# Patient Record
Sex: FEMALE | Race: WHITE
[De-identification: names, ages, dates, MRNs, and addresses within clinical notes are randomized per-mention and may not be internally consistent; named-entity substitution may affect disease eponyms.]

---

## 2018-11-28 ENCOUNTER — HOSPITAL ENCOUNTER (EMERGENCY)
Dept: HOSPITAL 97 - ER | Age: 38
LOS: 1 days | Discharge: HOME | End: 2018-11-29
Payer: COMMERCIAL

## 2018-11-28 DIAGNOSIS — E78.00: ICD-10-CM

## 2018-11-28 DIAGNOSIS — E04.1: Primary | ICD-10-CM

## 2018-11-28 PROCEDURE — 99282 EMERGENCY DEPT VISIT SF MDM: CPT

## 2018-11-29 VITALS — SYSTOLIC BLOOD PRESSURE: 127 MMHG | TEMPERATURE: 98.2 F | OXYGEN SATURATION: 99 % | DIASTOLIC BLOOD PRESSURE: 87 MMHG

## 2018-11-29 NOTE — ER
Nurse's Notes                                                                                     

 [facility]                                                                

Name: Dom Lim                                                                              

Age: 38 yrs                                                                                       

Sex: Female                                                                                       

: 1980                                                                                   

MRN: [id number]                                                                                   

Arrival Date: 2018                                                                          

Time: 21:52                                                                                       

Account#: [id number]                                                                            

Bed 11                                                                                            

Private MD:                                                                                       

Diagnosis: Thyroid nodule                                                                         

                                                                                                  

Presentation:                                                                                     

                                                                                             

22:45 Presenting complaint: Patient states: Throat is getting swallow. Patient reports        ao  

      getting a viral infections last week and then started notice her neck is getting            

      swallow. Patient report pain in her neck and ear. Transition of care: patient was not       

      received from another setting of care. Onset of symptoms is unknown. Risk Assessment:       

      Do you want to hurt yourself or someone else? Patient reports no desire to harm self or     

      others. Initial Sepsis Screen: Does the patient meet any 2 criteria? No. Patient's          

      initial sepsis screen is negative. Does the patient have a suspected source of              

      infection? No. Patient's initial sepsis screen is negative. Care prior to arrival: None.    

22:45 Method Of Arrival: Ambulatory                                                           ao  

22:45 Acuity: SABA 4                                                                           ao  

                                                                                                  

Triage Assessment:                                                                                

23:21 General: Appears comfortable, Behavior is calm, cooperative, appropriate for age. Pain: fc  

      Complains of pain in neck Quality of pain is described as aching, dull, Pain began          

      years ago. Is intermittent. EENT: No deficits noted. Neuro: Level of Consciousness is       

      awake, alert, obeys commands, Oriented to person, place, time, situation.                   

      Cardiovascular: No deficits noted. Respiratory: No deficits noted. GI: No deficits          

      noted. : No deficits noted. Derm: Skin is pink, warm \T\ dry. knot noted to frontal       

      neck. Musculoskeletal: Circulation, motion, and sensation intact. Capillary refill < 3      

      seconds, Range of motion: intact in all extremities.                                        

                                                                                                  

OB/GYN:                                                                                           

22:49 LMP N/A - Hysterectomy                                                                  ao  

                                                                                                  

Historical:                                                                                       

- Allergies:                                                                                      

22:49 No Known Allergies;                                                                     ao  

- Home Meds:                                                                                      

22:49 None [Active];                                                                          ao  

- PMHx:                                                                                           

22:49 High Cholesterol; Migraines; Endometrosis;                                              ao  

- PSHx:                                                                                           

22:49 Hysterectomy; Appendectomy; Cholecystectomy;                                            ao  

                                                                                                  

- Immunization history:: Adult Immunizations up to date.                                          

- Social history:: Smoking status: Patient/guardian denies using tobacco,                         

  Patient/guardian denies using alcohol, street drugs.                                            

- Ebola Screening: : Patient negative for fever greater than or equal to 101.5 degrees            

  Fahrenheit, and additional compatible Ebola Virus Disease symptoms Patient denies               

  exposure to infectious person Patient denies travel to an Ebola-affected area in the            

  21 days before illness onset.                                                                   

                                                                                                  

                                                                                                  

Screenin:20 Abuse screen: Denies threats or abuse. Nutritional screening: No deficits noted.        fc  

      Tuberculosis screening: No symptoms or risk factors identified. Fall Risk None              

      identified.                                                                                 

                                                                                                  

Assessment:                                                                                       

23:23 Reassessment: No changes from previously documented assessment. Patient and/or family   fc  

      updated on plan of care and expected duration. Pain level reassessed. Patient is alert,     

      oriented x 3, equal unlabored respirations, skin warm/dry/pink.                             

                                                                                             

00:35 Reassessment: Zaheer CONDE at bedside to see and examine pt.                              fc  

                                                                                                  

Vital Signs:                                                                                      

                                                                                             

22:49  / 87; Pulse 82; Resp 18; Temp 98.2(O); Pulse Ox 99% on R/A; Weight 79.38 kg;     ao  

      Height 5 ft. 4 in. (162.56 cm); Pain 7/10;                                                  

                                                                                             

01:33 Pulse 80; Resp 16; Pulse Ox 99% on R/A;                                                 ao  

                                                                                             

22:49 Body Mass Index 30.04 (79.38 kg, 162.56 cm)                                             ao  

                                                                                                  

ED Course:                                                                                        

                                                                                             

21:52 Patient arrived in ED.                                                                  ds1 

22:47 Triage completed.                                                                       ao  

22:50 Arm band placed on right wrist. Patient placed in waiting room, Patient notified of     ao  

      wait time.                                                                                  

23:20 Patient has correct armband on for positive identification. Bed in low position. Call   fc  

      light in reach.                                                                             

23:20 No provider procedures requiring assistance completed.                                  fc  

23:29 Zaheer Sunshine NP is PHCP.                                                           pm1 

23:29 Candelario Rios MD is Attending Physician.                                             pm1 

                                                                                             

01:33 Patient did not have IV access during this emergency room visit.                        ao  

                                                                                                  

Administered Medications:                                                                         

No medications were administered                                                                  

                                                                                                  

                                                                                                  

Outcome:                                                                                          

01:10 Discharge ordered by MD.                                                                pm1 

01:33 Discharged to home ambulatory.                                                          ao  

01:33 Condition: stable                                                                           

01:33 Discharge instructions given to patient, Instructed on discharge instructions, follow       

      up and referral plans. Demonstrated understanding of instructions, follow-up care,          

      medications, Prescriptions given X 1.                                                       

01:34 Patient left the ED.                                                                    ao  

                                                                                                  

Signatures:                                                                                       

Chretien, Yasmin, RN                   RN   Lissette Maier                                ds1                                                  

Tanner Rubio, RN                         RN   ao                                                   

Zaheer Sunshine, NP                    NP   pm1                                                  

                                                                                                  

**************************************************************************************************

## 2018-11-29 NOTE — EDPHYS
Physician Documentation                                                                           

 [facility]                                                                

Name: Dom Lim                                                                              

Age: 38 yrs                                                                                       

Sex: Female                                                                                       

: 1980                                                                                   

MRN: [id number]                                                                                   

Arrival Date: 2018                                                                          

Time: 21:52                                                                                       

Account#: [id number]                                                                            

Bed 11                                                                                            

Private MD:                                                                                       

ED Physician Candelario Rios                                                                      

HPI:                                                                                              

                                                                                             

01:00 This 38 yrs old  Female presents to ER via Ambulatory with complaints of       pm1 

      Throat Pain.                                                                                

01:00 The patient or guardian complains of nodule on left side of neck. The symptoms are      pm1 

      located anterior right aspect of neck. Onset: The symptoms/episode began/occurred 1         

      week(s) ago. Context:. Associated signs and symptoms: Pertinent positives: sore throat,     

      Pertinent negatives: fever, headache, nausea, vomiting. The pain does not radiate. The      

      patient has been recently seen by a physician: the patient's primary care provider, Dr. zee with similar presenting complaints, and apparently given a diagnosis of               

      pharyngitis. Patient with thyroid node to the right side of her neck that is causing        

      some pain. she has had it biopsied in the past, 1 year ago and it was benign. Patient       

      noticed it again about 1 week ago with symptoms of sore throat.                             

                                                                                                  

OB/GYN:                                                                                           

                                                                                             

22:49 LMP N/A - Hysterectomy                                                                  ao  

                                                                                                  

Historical:                                                                                       

- Allergies:                                                                                      

22:49 No Known Allergies;                                                                     ao  

- Home Meds:                                                                                      

22:49 None [Active];                                                                          ao  

- PMHx:                                                                                           

22:49 High Cholesterol; Migraines; Endometrosis;                                              ao  

- PSHx:                                                                                           

22:49 Hysterectomy; Appendectomy; Cholecystectomy;                                            ao  

                                                                                                  

- Immunization history:: Adult Immunizations up to date.                                          

- Social history:: Smoking status: Patient/guardian denies using tobacco,                         

  Patient/guardian denies using alcohol, street drugs.                                            

- Ebola Screening: : Patient negative for fever greater than or equal to 101.5 degrees            

  Fahrenheit, and additional compatible Ebola Virus Disease symptoms Patient denies               

  exposure to infectious person Patient denies travel to an Ebola-affected area in the            

  21 days before illness onset.                                                                   

                                                                                                  

                                                                                                  

ROS:                                                                                              

                                                                                             

01:00 Constitutional: Negative for fever, chills, and weight loss, Eyes: Negative for injury, pm1 

      pain, redness, and discharge, ENT: Negative for injury, pain, and discharge.                

      Cardiovascular: Negative for chest pain, palpitations, and edema, Respiratory: Negative     

      for shortness of breath, cough, wheezing, and pleuritic chest pain, Abdomen/GI:             

      Negative for abdominal pain, nausea, vomiting, diarrhea, and constipation, Back:            

      Negative for injury and pain, : Negative for injury, bleeding, discharge, and             

      swelling, MS/Extremity: Negative for injury and deformity, Skin: Negative for injury,       

      rash, and discoloration, Neuro: Negative for headache, weakness, numbness, tingling,        

      and seizure.                                                                                

      Neck: Positive for mass, swollen nodes, Negative for pain with movement.                    

                                                                                                  

Exam:                                                                                             

01:00 Constitutional:  This is a well developed, well nourished patient who is awake, alert,  pm1 

      and in no acute distress. Head/Face:  Normocephalic, atraumatic. Eyes:  Pupils equal        

      round and reactive to light, extra-ocular motions intact.  Lids and lashes normal.          

      Conjunctiva and sclera are non-icteric and not injected.  Cornea within normal limits.      

      Periorbital areas with no swelling, redness, or edema. ENT:  Nares patent. No nasal         

      discharge, no septal abnormalities noted.  Tympanic membranes are normal and external       

      auditory canals are clear.  Oropharynx with no redness, swelling, or masses, exudates,      

      or evidence of obstruction, uvula midline.  Mucous membranes moist.                         

01:00 Chest/axilla:  Normal chest wall appearance and motion.  Nontender with no deformity.       

      No lesions are appreciated. Cardiovascular:  Regular rate and rhythm with a normal S1       

      and S2.  No gallops, murmurs, or rubs.  Normal PMI, no JVD.  No pulse deficits.             

      Respiratory:  Lungs have equal breath sounds bilaterally, clear to auscultation and         

      percussion.  No rales, rhonchi or wheezes noted.  No increased work of breathing, no        

      retractions or nasal flaring. Abdomen/GI:  Soft, non-tender, with normal bowel sounds.      

      No distension or tympany.  No guarding or rebound.  No evidence of tenderness               

      throughout. Back:  No spinal tenderness.  No costovertebral tenderness.  Full range of      

      motion. Skin:  Warm, dry with normal turgor.  Normal color with no rashes, no lesions,      

      and no evidence of cellulitis. MS/ Extremity:  Pulses equal, no cyanosis.                   

      Neurovascular intact.  Full, normal range of motion. Neuro:  Awake and alert, GCS 15,       

      oriented to person, place, time, and situation.  Cranial nerves II-XII grossly intact.      

      Motor strength 5/5 in all extremities.  Sensory grossly intact.  Cerebellar exam            

      normal.  Normal gait.                                                                       

01:00 Neck: External neck: mass, that is very small, of the  right aspect of  thyroid, that       

      is tender to palpation, Lymph nodes: no appreciated lymphadenopathy.                        

                                                                                                  

Vital Signs:                                                                                      

                                                                                             

22:49  / 87; Pulse 82; Resp 18; Temp 98.2(O); Pulse Ox 99% on R/A; Weight 79.38 kg;     ao  

      Height 5 ft. 4 in. (162.56 cm); Pain 7/10;                                                  

                                                                                             

01:33 Pulse 80; Resp 16; Pulse Ox 99% on R/A;                                                 ao  

                                                                                             

22:49 Body Mass Index 30.04 (79.38 kg, 162.56 cm)                                             ao  

                                                                                                  

MDM:                                                                                              

                                                                                             

23:29 Patient medically screened.                                                             pm1 

                                                                                             

01:01 Physician consultation: Candelario Rios MD regarding consult, patient's condition,       pm1 

      recommends ultrasound outpatient for her possible thyroid nodule.                           

01:08 Data reviewed: vital signs. Data interpreted: Pulse oximetry: on room air is 99 %.      pm1 

      Interpretation: normal. Counseling: I had a detailed discussion with the patient and/or     

      guardian regarding: the historical points, exam findings, and any diagnostic results        

      supporting the discharge/admit diagnosis, the need for outpatient follow up, to return      

      to the emergency department if symptoms worsen or persist or if there are any questions     

      or concerns that arise at home.                                                             

                                                                                                  

Administered Medications:                                                                         

No medications were administered                                                                  

                                                                                                  

                                                                                                  

Disposition:                                                                                      

06:41 Co-signature as Attending Physician, Candelario Rios MD Available for consultation at    ps1 

      all times. .                                                                                

                                                                                                  

Disposition:                                                                                      

18 01:10 Discharged to Home. Impression: Thyroid nodule.                                    

- Condition is Stable.                                                                            

- Discharge Instructions: Thyroid Nodule.                                                         

- Prescriptions for Tylenol- Codeine #3 300-30 mg Oral Tablet - take 2 tablets by ORAL            

  route every 6 hours As needed; 20 tablet.                                                       

- Medication Reconciliation Form, Thank You Letter, Antibiotic Education, Prescription            

  Opioid Use form.                                                                                

- Follow up: Emergency Department; When: As needed; Reason: Worsening of condition.               

  Follow up: Private Physician; When: 2 - 3 days; Reason: Recheck today's complaints,             

  Continuance of care, Re-evaluation by your physician.                                           

- Problem is new.                                                                                 

- Symptoms have improved.                                                                         

                                                                                                  

                                                                                                  

                                                                                                  

Signatures:                                                                                       

Tanner Rubio, RN                         RN   johnny Villanuevanas, Zaheer, NP                    NP   pm1                                                  

Candelario Rios MD MD   ps1                                                  

                                                                                                  

Corrections: (The following items were deleted from the chart)                                    

01:11 01:10 2018 01:10 Discharged to Home. Impression: Acute pharyngitis. Condition is  pm1 

      Stable. Forms are Medication Reconciliation Form, Thank You Letter, Antibiotic              

      Education, Prescription Opioid Use. Follow up: Emergency Department; When: As needed;       

      Reason: Worsening of condition. Follow up: Private Physician; When: 2 - 3 days; Reason:     

      Recheck today's complaints, Continuance of care, Re-evaluation by your physician.           

      Problem is new. Symptoms have improved. pm1                                                 

01:34 01:11 2018 01:10 Discharged to Home. Impression: Thyroid nodule. Condition is     ao  

      Stable. Discharge Instructions: Thyroid Nodule. Prescriptions for Tylenol-Codeine #3        

      300-30 mg Oral Tablet - take 2 tablets by ORAL route every 6 hours As needed; 20            

      tablet. and Forms are Medication Reconciliation Form, Thank You Letter, Antibiotic          

      Education, Prescription Opioid Use. Follow up: Emergency Department; When: As needed;       

      Reason: Worsening of condition. Follow up: Private Physician; When: 2 - 3 days; Reason:     

      Recheck today's complaints, Continuance of care, Re-evaluation by your physician.           

      Problem is new. Symptoms have improved. pm1                                                 

                                                                                                  

**************************************************************************************************

## 2020-08-09 ENCOUNTER — HOSPITAL ENCOUNTER (EMERGENCY)
Dept: HOSPITAL 97 - ER | Age: 40
Discharge: HOME | End: 2020-08-09
Payer: SELF-PAY

## 2020-08-09 VITALS — OXYGEN SATURATION: 96 % | TEMPERATURE: 99.4 F

## 2020-08-09 VITALS — DIASTOLIC BLOOD PRESSURE: 75 MMHG | SYSTOLIC BLOOD PRESSURE: 109 MMHG

## 2020-08-09 DIAGNOSIS — U07.1: Primary | ICD-10-CM

## 2020-08-09 DIAGNOSIS — L03.116: ICD-10-CM

## 2020-08-09 PROCEDURE — 87070 CULTURE OTHR SPECIMN AEROBIC: CPT

## 2020-08-09 PROCEDURE — 87804 INFLUENZA ASSAY W/OPTIC: CPT

## 2020-08-09 PROCEDURE — 71045 X-RAY EXAM CHEST 1 VIEW: CPT

## 2020-08-09 PROCEDURE — 87081 CULTURE SCREEN ONLY: CPT

## 2020-08-09 PROCEDURE — 99283 EMERGENCY DEPT VISIT LOW MDM: CPT

## 2020-08-09 NOTE — XMS REPORT
Summary of Care

                             Created on:2020



Patient:Dom Lim

Sex:Female

:1980

External Reference #:[id number]





Demographics







                          Address                   [address] [address]



                                                    [place] [address]

 

                          Mobile Phone              [phone]

 

                          Home Phone                [phone]

 

                          Email Address             [email or web address]

 

                          Preferred Language        English

 

                          Marital Status            Single

 

                          Mu-ism Affiliation     Unknown

 

                          Race                      White

 

                          Ethnic Group              Not  or 









Author







                          Organization              [facility]

 

                          Address                   [address] [place] [id number]









Support







                Name            Relationship    Address         Phone

 

                Georgette Sky  Unavailable     [address]    [phone]



                                                [place] [address] 

 

                Hipolito Lim  Unavailable     [address] [phone]



                                                [place] [address] 









Care Team Providers







                    Name                Role                Phone

 

                    Unavailable         Primary Care Provider Unavailable









Reason for Visit







                          Reason                    Comments

 

                          Exposure                  







Encounter Details







             Date         Type         Department   Care Team    Description

 

                2020      Laboratory Only [facility] Kobe Cali, FNP



[address] [id number] [phone] (Fax)                      Exposure to Covid-19



                                                            Medicine - [place]



                                        



Lab, [facility]                      [facility]



                                       [address] [place], [place]              



                                                            [id number] [phone]              







Allergies







             Active Allergy Reactions    Severity     Noted Date   Comments

 

             Morphine     Nausea and/or Vomiting              2013   



documented as of this encounter (statuses as of 2020)



Medications







          Medication Sig       Dispensed Refills   Start Date End Date  Status

 

          atorvastatin (LIPITOR) 20 Take 1 Tab by 90 Tab    1         2015

           Active



          mg tabletIndications: Pure mouth at                                   

       



          hypercholesterolemia bedtime.                                         

 

 

          amitriptyline 50 mg tablet Take 50 mg by           0                  

           Active



                    mouth at                                          



                    bedtime.                                          

 

          pantoprazole 40 mg EC Take 1 tablet 30 tablet 0         2017    

       Active



          tablet    by mouth                                          



                    daily.                                            

 

          ibuprofen 600 mg tablet Take 1 tablet 20 tablet 0         2017  

         Active



                    by mouth                                          



                    every 8                                           



                    (eight) hours                                         



                    as needed for                                         



                    Pain (scale                                         



                    4-6).                                             



documented as of this encounter (statuses as of 2020)



Active Problems







                          Problem                   Noted Date

 

                          Dyslipidemia              2017

 

                          Obesity (BMI 30-39.9)     2017

 

                          Atypical chest pain       2017

 

                          Multiple thyroid nodules  2015

 

                          Weight gain               2015

 

                          Fatigue                   2015

 

                          Pure hypercholesterolemia 2015

 

                          Cyst, breast solitary     2013









                                        Overview: 



2013- IMPRESSION:



                                        LEFT BREAST: Simple cyst on ultrasound a

t 9 o'clock. Benign, no evidence



                                        of malignancy. Age appropriate exams. As

 per ACR and ACS guidelines,



                                        mammmograms recommended starting at age 

40.



                                        



                                        RIGHT BREAST: Negative, no evidence of m

alignancy. Normal interval



                                        follow-up is recommended in 12 months.



                                        These findings and recommendations were 

discussed in detail with the



                                        patient at the conclusion of today's exa

mination.



                                        



                                        OVERALL ASSESSMENT - CATEGORY 2 - BENIGN



                                        END OF IMPRESSION









                          Encounter for routine gynecological examination 2013









                                        Overview: 



Medical records received. Tanna. DOS- 05/10/2012. Impression: several 
masses within the left breast likely representing benign complex cysts. 
Recommended f/u in 6 months. Benign appearing right breast.



                                        



                                        ICD10 Diagnosis Term Replacer Utility









                          Generalized anxiety disorder 2013

 

                          Migraines                 2013

 

                          H/O total hysterectomy    2013

 

                          Anemia                    2013









                                        Overview: 







                                        ICD10 Diagnosis Term Replacer Utility









                          Pain pelvic               2013









                                        Overview: 







                                        Medical record received. DOS- 2013

. Recommendation for Ultrasound.



documented as of this encounter (statuses as of 2020)



Social History







             Tobacco Use  Types        Packs/Day    Years Used   Date

 

             Never Smoker                                        









                Smokeless Tobacco: Never Used                                 









                Alcohol Use     Drinks/Week     oz/Week         Comments

 

                Not Asked                                       









                          Sex Assigned at Birth     Date Recorded

 

                          Not on file               









                    Job Start Date      Occupation          Industry

 

                    Not on file         Not on file         Not on file









                    Travel History      Travel Start        Travel End









                                        No recent travel history available.









                    COVID-19 Exposure   Response            Date Recorded

 

                    In the last month, have you been in contact with Yes        

         2020  3:36 PM CDT



                    someone who was confirmed or suspected to have              

       



                    Coronavirus / COVID-19?                     



documented as of this encounter



Last Filed Vital Signs

Not on filedocumented in this encounter



Plan of Treatment







             Name         Type         Priority     Associated Diagnoses Order S

chedule

 

             COVID-19 (PCR MOLECULAR LAB          Routine      Exposure to Covid

-19 Expected: 2020,



             TESTING)                               [facility]        Expires: 2021









                Health Maintenance Due Date        Last Done       Comments

 

                DTaP,Tdap,and Td Vaccines (1 1989                      



                - Tdap)                                         

 

                Depression Screening 1990                      

 

                PAP SMEAR       2016,     



                                                2004      

 

                Breast Cancer Screening 2018      



                (MAMMOGRAM)                                     

 

                INFLUENZA VACCINE (#1) 2020                      

 

                PNEUMOCOCCAL 0-64 YEARS Aged Out                        No longe

r eligible based



                COMBINED SERIES                                 on patient's age

 to



                                                                complete this to

pic



documented as of this encounter



Results

Not on filedocumented in this encounter



Visit Diagnoses







                                        Diagnosis

 

                                        Exposure to Covid-19 Virus



documented in this encounter



Additional Health Concerns







                Infection       Onset Date      Last Indicated  Resolved Time

 

                COVID-19 Rule Out 2020      



documented as of this encounter

## 2020-08-09 NOTE — RAD REPORT
EXAM DESCRIPTION:  RAD - Chest Single View - 8/9/2020 3:14 pm

 

CLINICAL HISTORY:  Cough;Fever

Chest pain.

 

COMPARISON:  Chest Pa And Lat (2 Views) dated 12/20/2017

 

FINDINGS:  Portable technique limits examination quality.

 

The lungs are grossly clear. The heart is normal in size. No displaced fractures.

 

IMPRESSION:  No acute intrathoracic process suspected.

## 2020-08-09 NOTE — XMS REPORT
Continuity of Care Document

                            Created on:2020



Patient:MIGUEL LOPEZ

Sex:Female

:1980

External Reference #:[id number]





Demographics







                          Address                   [address]



                                                    [place] [address]

 

                          Home Phone                [phone]

 

                          Work Phone                [phone]

 

                          Mobile Phone              [phone]

 

                          Email Address             [email or web address]

 

                          Preferred Language        English

 

                          Marital Status            Unknown

 

                          Zoroastrianism Affiliation     Unknown

 

                          Race                      Unknown

 

                          Additional Race(s)        Unavailable



                                                    White

 

                          Ethnic Group              Not  or 









Author







                          Organization              [facility]

t

 

                          Address                   [address]



                                                    [place] [address]

 

                          Phone                     [phone]









Support







                Name            Relationship    Address         Phone

 

                Asya        Other           [address]    [phone]



                                                [place] [address] 

 

                Raphael         Spouse          [address] [phone]



                                                [place] [address] 









Care Team Providers







                    Name                Role                Phone

 

                    You RN             Attending Clinician Unavailable

 

                    Lab,  [facility]     Attending Clinician Unavailable









Problems

This patient has no known problems.



Allergies, Adverse Reactions, Alerts

This patient has no known allergies or adverse reactions.



Medications

This patient has no known medications.



Procedures

This patient has no known procedures.



Encounters







        Start   End     Encounter Admission Attending Care    Care    Encounter 

Source



        Date/Time Date/Time Type    Type    Clinicians Facility Department ID   

   

 

        2020-07-10 2020-07-10 Telephone         Ana Orta    [phone] [id number]

[id number] 



        00:00:00 00:00:00                         [facility]   [id number]         



                                                [facility] [id number]         



                                                        [phone]         



                                                        [phone]             

 

        2020 Laboratory         Lab, [facility]    [id number]

[id number] 



        06:51:46 07:11:46 Only            [facility]  [id number]         



                                                [place] [id number]         



                                                [place] [phone]         



                                                nal     044             



                                                Office                  



                                                Building                 



                                                One                     







Results

This patient has no known results.

## 2020-08-09 NOTE — ER
Nurse's Notes                                                                                     

 [facility]                                                                 

Name: Dom Lim                                                                              

Age: 40 yrs                                                                                       

Sex: Female                                                                                       

: 1980                                                                                   

MRN: [id number]                                                                                   

Arrival Date: 2020                                                                          

Time: 13:52                                                                                       

Account#: [id number]                                                                            

Bed 15                                                                                            

Private MD: Paolo Thorpe R                                                                       

Diagnosis: Cellulitis of left lower limb                                                          

                                                                                                  

Presentation:                                                                                     

                                                                                             

14:13 Chief complaint: Patient states: fever up to 102.9 F, cough, sore throat, hoarse voice, aa5 

      and chest pressure that began Thursday. Last Tylenol was today around 1230.                 

14:13 Coronavirus screen: cough unrelated to allergies, fever, sore throat, Client presents   aa5 

      with at least one sign or symptom that may indicate coronavirus-19. Standard/surgical       

      mask placed on the client. Provider contacted for isolation considerations. Ebola           

      Screen: Patient negative for fever greater than or equal to 101.5 degrees Fahrenheit,       

      and additional compatible Ebola Virus Disease symptoms. Initial Sepsis Screen: Does the     

      patient meet any 2 criteria? No. Patient's initial sepsis screen is negative. Does the      

      patient have a suspected source of infection? No. Patient's initial sepsis screen is        

      negative. Risk Assessment: Do you want to hurt yourself or someone else? Patient            

      reports no desire to harm self or others. Onset of symptoms was 2020.                

14:13 Method Of Arrival: Ambulatory                                                           aa5 

14:13 Acuity: SABA 3                                                                           aa5 

                                                                                                  

Historical:                                                                                       

- Allergies:                                                                                      

14:13 No Known Allergies;                                                                     aa5 

- PMHx:                                                                                           

14:13 Endometrosis; High Cholesterol; Migraines;                                              aa5 

- PSHx:                                                                                           

14:13 Hysterectomy; Appendectomy; Cholecystectomy;                                            aa5 

                                                                                                  

- Immunization history:: Adult Immunizations up to date.                                          

- Family history:: not pertinent.                                                                 

- Social history:: Smoking status: Patient denies any tobacco usage or history of.                

- Hospitalizations: : No recent hospitalization is reported.                                      

                                                                                                  

                                                                                                  

Screenin:54 Abuse screen: Denies threats or abuse. Denies injuries from another. Nutritional        ks7 

      screening: No deficits noted. Tuberculosis screening: No symptoms or risk factors           

      identified. Fall Risk None identified.                                                      

                                                                                                  

Assessment:                                                                                       

14:52 General: Appears in no apparent distress. uncomfortable, Behavior is cooperative. Pain: ks7 

      Complains of pain in sore throat, "achy bones", HA Pain currently is 8 out of 10 on a       

      pain scale. Quality of pain is described as aching, Pain began 2-3 days ago. Is             

      continuous. Respiratory: Airway is patent Respiratory effort is even, RIVERA Breath sounds     

      are clear bilaterally. in right upper lobe, left upper lobe, left posterior lower lobe,     

      right posterior middle lobe and right posterior lower lobe. EENT: Throat is reddened        

      with gag reflex present.                                                                    

15:51 Reassessment: Patient is alert, oriented x 3, equal unlabored respirations, skin        ks7 

      warm/dry/pink.                                                                              

                                                                                                  

Vital Signs:                                                                                      

14:13  / 72; Pulse 97; Resp 16 S; Temp 99.7(O); Pulse Ox 99% on R/A;                    aa5 

14:30  / 72; Pulse 88; Resp 18; Pulse Ox 97% on R/A; Pain 8/10;                         ks7 

15:50  / 80; Pulse 90; Resp 18; Pulse Ox 100% on R/A; Pain 7/10;                        ks7 

17:22  / 75; Pulse 93; Resp 18; Pulse Ox 100% on R/A; Pain 5/10;                        ks7 

18:04  / 75; Pulse 85; Resp 18; Temp 99.4(O); Pulse Ox 96% on R/A; Pain 5/10;           ks7 

                                                                                                  

ED Course:                                                                                        

13:52 Patient arrived in ED.                                                                  ag5 

13:53 Paolo Thorpe MD is Private Physician.                                                  ag5 

13:59 Marin Duran MD is Attending Physician.                                                rn  

14:13 Arm band placed on.                                                                     aa5 

14:27 Xenia Alvarado, RN is Primary Nurse.                                                ks7 

14:33 Triage completed.                                                                       aa5 

14:52 COVID-19 Sent.                                                                          ks7 

14:52 Flu Sent.                                                                               ks7 

14:52 Strep Sent.                                                                             ks7 

14:54 Resting quietly.                                                                        ks7 

14:54 Patient has correct armband on for positive identification. Bed in low position. Call   ks7 

      light in reach. Side rails up X2.                                                           

14:54 No provider procedures requiring assistance completed. Patient did not have IV access   ks7 

      during this emergency room visit.                                                           

                                                                                                  

Administered Medications:                                                                         

14:52 Drug: Bactrim (160 mg-800 mg (DS) 1 tablet Route: PO;                                   ks7 

                                                                                                  

                                                                                                  

Outcome:                                                                                          

17:18 Discharge ordered by MD.                                                                rn  

18:04 Discharged to home ambulatory.                                                          ks7 

18:04 Condition: stable                                                                           

18:04 Discharge instructions given to patient, Instructed on discharge instructions,              

      medication usage, Demonstrated understanding of instructions, medications,                  

      Prescriptions given X 1.                                                                    

18:07 Patient left the ED.                                                                    ks7 

                                                                                                  

Addendum:                                                                                         

2020                                                                                        

     11:55 Addendum: COVID-19 Result: Positive result giiven to ED physician to notify pt.         i
w

           Physician: Michael Garza MD Physician was able to contact pt and pt was notified of    

           positive COVID-19 swab result. Physician answered pt questions.                        

                                                                                                  

Signatures:                                                                                       

Perlita Edgar, RN                     Marin Dey MD MD rn Calderon, Audri, RN                     RN   aa5                                                  

Sarah Fragoso                                5                                                  

Xenia Alvarado RN                  RN   ks7                                                  

                                                                                                  

Corrections: (The following items were deleted from the chart)                                    

                                                                                             

14:56 14:54  / 72; Pulse 88bpm; Resp 18bpm; Pulse Ox 97% RA; Pain 8/10; ks7             ks7 

                                                                                                  

**************************************************************************************************

## 2020-08-09 NOTE — EDPHYS
Physician Documentation                                                                           

 [facility]                                                                 

Name: Dom Lim                                                                              

Age: 40 yrs                                                                                       

Sex: Female                                                                                       

: 1980                                                                                   

MRN: [id number]                                                                                   

Arrival Date: 2020                                                                          

Time: 13:52                                                                                       

Account#: [id number]                                                                            

Bed 15                                                                                            

Private MD: Paolo Thorpe R                                                                       

ED Physician Marin Duran                                                                         

HPI:                                                                                              

                                                                                             

14:45 This 40 yrs old  Female presents to ER via Ambulatory with complaints of       rn  

      Cough, Fever, Sore Throat, Chest Pressure.                                                  

14:45 The patient or guardian reports cough, chest pressure.                                  rn  

14:45 Onset: The symptoms/episode began/occurred 3 day(s) ago. Severity of symptoms: At their rn  

      worst the symptoms were mild, in the emergency department the symptoms are unchanged.       

      Associated signs and symptoms: Pertinent positives: fever, sore throat, cough, rash.        

      The patient has experienced similar episodes in the past. Reports fever and chills, +       

      myalgias, began a few days ago, assoc with mild cough and chest pressure, also reports      

      "another staph infection" to left outer thigh. No abd pain or urinary symptoms. .           

                                                                                                  

Historical:                                                                                       

- Allergies:                                                                                      

14:13 No Known Allergies;                                                                     aa5 

- PMHx:                                                                                           

14:13 Endometrosis; High Cholesterol; Migraines;                                              aa5 

- PSHx:                                                                                           

14:13 Hysterectomy; Appendectomy; Cholecystectomy;                                            aa5 

                                                                                                  

- Immunization history:: Adult Immunizations up to date.                                          

- Family history:: not pertinent.                                                                 

- Social history:: Smoking status: Patient denies any tobacco usage or history of.                

- Hospitalizations: : No recent hospitalization is reported.                                      

                                                                                                  

                                                                                                  

ROS:                                                                                              

14:45 Constitutional: + fever Eyes: Negative for injury, pain, redness, and discharge, ENT: + rn  

      sore throat Neck: Negative for injury, pain, and swelling, Cardiovascular: Negative for     

      chest pain, palpitations, and edema, Respiratory: + cough, neg sob Abdomen/GI: Negative     

      for abdominal pain, nausea, vomiting, diarrhea, and constipation, MS/Extremity:             

      Negative for injury and deformity, Skin: Negative for injury, rash, and discoloration,      

      Neuro: Negative for weakness, numbness, tingling, and seizure.                              

                                                                                                  

Exam:                                                                                             

14:45 Constitutional:  This is a well developed, well nourished patient who is awake, alert,  rn  

      and in no acute distress. Head/Face:  Normocephalic, atraumatic. ENT:  no stridor           

      Cardiovascular:  Regular rate and rhythm.  No pulse deficits. Respiratory:  Speaking        

      full sentences.  No increased work of breathing, no retractions or nasal flaring.           

      Abdomen/GI:  soft, non-tender Skin:  Warm, dry, + left outer mid-thigh with draining        

      wound, + purulence, no fluctuance, + approx 8-10cm irregular erythema with warmth and       

      blanches MS/ Extremity:  Pulses equal, no cyanosis.  Neurovascular intact.  Full,           

      normal range of motion.  Equal circumference. Neuro:  Awake and alert, GCS 15, oriented     

      to person, place, time, and situation.  Cranial nerves II-XII grossly intact.  Motor        

      strength 5/5 in all extremities.  Sensory grossly intact.  Cerebellar exam normal.          

      Normal gait.                                                                                

                                                                                                  

Vital Signs:                                                                                      

14:13  / 72; Pulse 97; Resp 16 S; Temp 99.7(O); Pulse Ox 99% on R/A;                    aa5 

14:30  / 72; Pulse 88; Resp 18; Pulse Ox 97% on R/A; Pain 8/10;                         ks7 

15:50  / 80; Pulse 90; Resp 18; Pulse Ox 100% on R/A; Pain 7/10;                        ks7 

17:22  / 75; Pulse 93; Resp 18; Pulse Ox 100% on R/A; Pain 5/10;                        ks7 

18:04  / 75; Pulse 85; Resp 18; Temp 99.4(O); Pulse Ox 96% on R/A; Pain 5/10;           ks7 

                                                                                                  

MDM:                                                                                              

13:59 Patient medically screened.                                                             rn  

17:17 Differential Diagnosis: Upper Respiratory Infection Viral Syndrome Pneumonia Other      rn  

      COVID-19, staph, cellulitis. Data reviewed: vital signs, nurses notes, lab test             

      result(s), radiologic studies, plain films, and as a result, I will discharge patient.      

      Counseling: I had a detailed discussion with the patient and/or guardian regarding: the     

      historical points, exam findings, and any diagnostic results supporting the                 

      discharge/admit diagnosis, lab results, radiology results, the need for outpatient          

      follow up, to return to the emergency department if symptoms worsen or persist or if        

      there are any questions or concerns that arise at home. Special discussion: I discussed     

      with the patient/guardian in detail that at this point there is no indication for           

      admission to the hospital. It is understood, however, that if the symptoms persist or       

      worsen the patient needs to return immediately for re-evaluation.                           

                                                                                                  

                                                                                             

14:25 Order name: Strep                                                                       rn  

                                                                                             

14:25 Order name: Flu                                                                         rn  

                                                                                             

14:25 Order name: COVID-19                                                                    rn  

                                                                                             

14:25 Order name: XRAY Chest (1 view)                                                         rn  

                                                                                             

16:47 Order name: Group A Streptococcus Rapid Sc; Complete Time: 17:17                        EDMS

                                                                                             

16:59 Order name: Influenza Screen (A ; Complete Time: 17:17                                  EDMS

                                                                                             

15:32 Order name: RAD; Complete Time: 15:43                                                   EDMS

                                                                                                  

Administered Medications:                                                                         

14:52 Drug: Bactrim (160 mg-800 mg (DS) 1 tablet Route: PO;                                   ks7 

                                                                                                  

                                                                                                  

Disposition:                                                                                      

20 17:18 Discharged to Home. Impression: Cellulitis of left lower limb.                     

- Condition is Stable.                                                                            

- Discharge Instructions: Cellulitis, Adult.                                                      

- Prescriptions for Bactrim - 160 mg Oral Tablet - take 1 tablet by ORAL route              

  every 12 hours for 10 days; 20 tablet.                                                          

- Medication Reconciliation Form, Thank You Letter, Antibiotic Education, Prescription            

  Opioid Use, Work release form form.                                                             

- Follow up: Private Physician; When: As needed; Reason: Recheck today's complaints,              

  Re-evaluation by your physician.                                                                

- Problem is new.                                                                                 

- Symptoms have improved.                                                                         

                                                                                                  

                                                                                                  

                                                                                                  

Signatures:                                                                                       

Dispatcher [employer]                           EDMS                                                 

Marin Duran MD MD rn Calderon, Audri RN                     RN   aa5                                                  

Xenia Alvarado RN                  RN   ks7                                                  

                                                                                                  

Corrections: (The following items were deleted from the chart)                                    

18:06 17:18 2020 17:18 Discharged to Home. Impression: Cellulitis of left lower limb.   ks7 

      Condition is Stable. Forms are Medication Reconciliation Form, Thank You Letter,            

      Antibiotic Education, Prescription Opioid Use. Follow up: Private Physician; When: As       

      needed; Reason: Recheck today's complaints, Re-evaluation by your physician. Problem is     

      new. Symptoms have improved. rn                                                             

18:07 18:06 2020 17:18 Discharged to Home. Impression: Cellulitis of left lower limb.   ks7 

      Condition is Stable. Discharge Instructions: Cellulitis, Adult. Prescriptions for           

      Bactrim -160 mg Oral Tablet - take 1 tablet by ORAL route every 12 hours for 10       

      days; 20 tablet. and Forms are Medication Reconciliation Form, Thank You Letter,            

      Antibiotic Education, Prescription Opioid Use, Work release form. Follow up: Private        

      Physician; When: As needed; Reason: Recheck today's complaints, Re-evaluation by your       

      physician. Problem is new. Symptoms have improved. ks7                                      

                                                                                                  

**************************************************************************************************

## 2020-08-09 NOTE — XMS REPORT
Summary of Care

                            Created on:July 10, 2020



Patient:Dom Lim

Sex:Female

:1980

External Reference #:[id number]





Demographics







                          Address                   [address] [place], [place] [address]



                                                    [place] [address]

 

                          Mobile Phone              [phone]

 

                          Home Phone                [phone]

 

                          Email Address             [email or web address]

 

                          Email Address             [email or web address]

 

                          Preferred Language        English

 

                          Marital Status            Single

 

                          Zoroastrianism Affiliation     Unknown

 

                          Race                      White

 

                          Ethnic Group              Not  or 









Author







                          Organization              [facility]

 

                          Address                   [address] [place] [id number]









Support







                Name            Relationship    Address         Phone

 

                Georgette Sky  Unavailable     [phone]    [phone]



                                                [place] [address] 

 

                Hipolito Lim  Unavailable     [address] [address] [phone]



                                                [place] [address] 









Care Team Providers







                    Name                Role                Phone

 

                    Unavailable         Primary Care Provider Unavailable









Reason for Visit







                          Reason                    Comments

 

                          Results                   







Encounter Details







             Date         Type         Department   Care Team    Description

 

                    07/10/2020          Telephone           [facility]



                          Ana Orta RN            Results



                                                            [address] [place] [address] [id number] [id number] [phone]              







Allergies







             Active Allergy Reactions    Severity     Noted Date   Comments

 

             Morphine     Nausea and/or Vomiting              2013   



documented as of this encounter (statuses as of 07/10/2020)



Medications







          Medication Sig       Dispensed Refills   Start Date End Date  Status

 

          atorvastatin (LIPITOR) 20 Take 1 Tab by 90 Tab    1         2015

           Active



          mg tabletIndications: Pure mouth at                                   

       



          hypercholesterolemia bedtime.                                         

 

 

          amitriptyline 50 mg tablet Take 50 mg by           0                  

           Active



                    mouth at                                          



                    bedtime.                                          

 

          pantoprazole 40 mg EC Take 1 tablet 30 tablet 0         2017    

       Active



          tablet    by mouth                                          



                    daily.                                            

 

          ibuprofen 600 mg tablet Take 1 tablet 20 tablet 0         2017  

         Active



                    by mouth                                          



                    every 8                                           



                    (eight) hours                                         



                    as needed for                                         



                    Pain (scale                                         



                    4-6).                                             



documented as of this encounter (statuses as of 07/10/2020)



Active Problems







                          Problem                   Noted Date

 

                          Dyslipidemia              2017

 

                          Obesity (BMI 30-39.9)     2017

 

                          Atypical chest pain       2017

 

                          Multiple thyroid nodules  2015

 

                          Weight gain               2015

 

                          Fatigue                   2015

 

                          Pure hypercholesterolemia 2015

 

                          Cyst, breast solitary     2013









                                        Overview: 



2013- IMPRESSION:



                                        LEFT BREAST: Simple cyst on ultrasound a

t 9 o'clock. Benign, no evidence



                                        of malignancy. Age appropriate exams. As

 per ACR and ACS guidelines,



                                        mammmograms recommended starting at age 

40.



                                        



                                        RIGHT BREAST: Negative, no evidence of m

alignancy. Normal interval



                                        follow-up is recommended in 12 months.



                                        These findings and recommendations were 

discussed in detail with the



                                        patient at the conclusion of today's exa

mination.



                                        



                                        OVERALL ASSESSMENT - CATEGORY 2 - BENIGN



                                        END OF IMPRESSION









                          Encounter for routine gynecological examination 2013









                                        Overview: 



Medical records received. Tanna. DOS- 05/10/2012. Impression: several 
masses within the left breast likely representing benign complex cysts. 
Recommended f/u in 6 months. Benign appearing right breast.



                                        



                                        ICD10 Diagnosis Term Replacer Utility









                          Generalized anxiety disorder 2013

 

                          Migraines                 2013

 

                          H/O total hysterectomy    2013

 

                          Anemia                    2013









                                        Overview: 







                                        ICD10 Diagnosis Term Replacer Utility









                          Pain pelvic               2013









                                        Overview: 







                                        Medical record received. DOS- 2013

. Recommendation for Ultrasound.



documented as of this encounter (statuses as of 07/10/2020)



Social History







             Tobacco Use  Types        Packs/Day    Years Used   Date

 

             Never Smoker                                        









                Smokeless Tobacco: Never Used                                 









                Alcohol Use     Drinks/Week     oz/Week         Comments

 

                Not Asked                                       









                          Sex Assigned at Birth     Date Recorded

 

                          Not on file               









                    Job Start Date      Occupation          Industry

 

                    Not on file         Not on file         Not on file









                    Travel History      Travel Start        Travel End









                                        No recent travel history available.









                    COVID-19 Exposure   Response            Date Recorded

 

                    In the last month, have you been in contact with Yes        

         2020  3:36 PM CDT



                    someone who was confirmed or suspected to have              

       



                    Coronavirus / COVID-19?                     



documented as of this encounter



Last Filed Vital Signs

Not on filedocumented in this encounter



Plan of Treatment







                Health Maintenance Due Date        Last Done       Comments

 

                DTaP,Tdap,and Td Vaccines (1 1989                      



                - Tdap)                                         

 

                Depression Screening 1990                      

 

                PAP SMEAR       2016,     



                                                2004      

 

                Breast Cancer Screening 2018      



                (MAMMOGRAM)                                     

 

                INFLUENZA VACCINE (#1) 2020                      

 

                PNEUMOCOCCAL 0-64 YEARS Aged Out                        No longe

r eligible based



                COMBINED SERIES                                 on patient's age

 to



                                                                complete this to

pic



documented as of this encounter



Results

Not on filedocumented in this encounter



Insurance







          Payer     Benefit Plan / Subscriber ID Effective Phone     Address   T

[employer]



                    Group               Dates                         

 

          [facility] [id number] 3/1/2017-[employer] [phone] [address]

[employer]



           HEALTH CHOICE HEALTH CHOICE            [id number]



                                        



                                                            [place], [place] 



                                                            [id number]     



documented as of this encounter

## 2021-03-17 ENCOUNTER — HOSPITAL ENCOUNTER (EMERGENCY)
Dept: HOSPITAL 97 - ER | Age: 41
Discharge: HOME | End: 2021-03-17
Payer: COMMERCIAL

## 2021-03-17 VITALS — DIASTOLIC BLOOD PRESSURE: 81 MMHG | TEMPERATURE: 98.7 F | SYSTOLIC BLOOD PRESSURE: 132 MMHG | OXYGEN SATURATION: 100 %
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
## 2021-03-17 DIAGNOSIS — S16.1XXA: Primary | ICD-10-CM

## 2021-03-17 DIAGNOSIS — E05.90: ICD-10-CM

## 2021-03-17 DIAGNOSIS — E78.00: ICD-10-CM

## 2021-03-17 DIAGNOSIS — H66.92: ICD-10-CM

## 2021-03-17 LAB
ALBUMIN SERPL BCP-MCNC: 4.1 G/DL (ref 3.4–5)
ALP SERPL-CCNC: (no result) U/L (ref 45–117)
ALT SERPL W P-5'-P-CCNC: 19 U/L (ref 12–78)
AST SERPL W P-5'-P-CCNC: 12 U/L (ref 15–37)
BLD SMEAR INTERP: (no result)
BUN BLD-MCNC: 9 MG/DL (ref 7–18)
GLUCOSE SERPLBLD-MCNC: 74 MG/DL (ref 74–106)
HCT VFR BLD CALC: 41 % (ref 36–45)
LYMPHOCYTES # SPEC AUTO: 2.6 K/UL (ref 0.7–4.9)
MAGNESIUM SERPL-MCNC: 2.6 MG/DL (ref 1.8–2.4)
MORPHOLOGY BLD-IMP: (no result)
PMV BLD: 9 FL (ref 7.6–11.3)
POTASSIUM SERPL-SCNC: 3.7 MMOL/L (ref 3.5–5.1)
RBC # BLD: 4.92 M/UL (ref 3.86–4.86)
TROPONIN (EMERG DEPT USE ONLY): < 0.02 NG/ML (ref 0–0.04)
TSH SERPL DL<=0.05 MIU/L-ACNC: 0.32 UIU/ML (ref 0.36–3.74)

## 2021-03-17 PROCEDURE — 80048 BASIC METABOLIC PNL TOTAL CA: CPT

## 2021-03-17 PROCEDURE — 83735 ASSAY OF MAGNESIUM: CPT

## 2021-03-17 PROCEDURE — 70491 CT SOFT TISSUE NECK W/DYE: CPT

## 2021-03-17 PROCEDURE — 99284 EMERGENCY DEPT VISIT MOD MDM: CPT

## 2021-03-17 PROCEDURE — 71045 X-RAY EXAM CHEST 1 VIEW: CPT

## 2021-03-17 PROCEDURE — 96375 TX/PRO/DX INJ NEW DRUG ADDON: CPT

## 2021-03-17 PROCEDURE — 84484 ASSAY OF TROPONIN QUANT: CPT

## 2021-03-17 PROCEDURE — 85025 COMPLETE CBC W/AUTO DIFF WBC: CPT

## 2021-03-17 PROCEDURE — 84443 ASSAY THYROID STIM HORMONE: CPT

## 2021-03-17 PROCEDURE — 80076 HEPATIC FUNCTION PANEL: CPT

## 2021-03-17 PROCEDURE — 96374 THER/PROPH/DIAG INJ IV PUSH: CPT

## 2021-03-17 PROCEDURE — 36415 COLL VENOUS BLD VENIPUNCTURE: CPT

## 2021-03-17 PROCEDURE — 93005 ELECTROCARDIOGRAM TRACING: CPT

## 2021-03-17 NOTE — ER
Nurse's Notes                                                                                     

 [facility]                                                                 

Name: Dom Lim                                                                              

Age: 41 yrs                                                                                       

Sex: Female                                                                                       

: 1980                                                                                   

MRN: [id number]                                                                                   

Arrival Date: 2021                                                                          

Time: 17:05                                                                                       

Account#: [id number]                                                                            

Bed 15                                                                                            

Private MD:                                                                                       

Diagnosis: Otitis media, unspecified, left ear;Strain of muscle, fascia and tendon at neck        

  level;Thyrotoxicosis [hyperthyroidism]                                                          

                                                                                                  

Presentation:                                                                                     

                                                                                             

17:14 Chief complaint: Patient states: Bilateral neck pain and lymph node swelling getting    ll1 

      progressively worse over 1 week. HA, ear feel stopped up, pain from neck radiates down      

      R arm. + weak, tired, fatigue. No known fever. Nausea off/on. Coronavirus screen:           

      Client denies travel out of the [place] in the last 14 days. At this time, the client does     

      not indicate any symptoms associated with coronavirus-19. Ebola Screen: Patient denies      

      travel to an Ebola-affected area in the 21 days before illness onset. Initial Sepsis        

      Screen: Does the patient meet any 2 criteria? No. Patient's initial sepsis screen is        

      negative. Does the patient have a suspected source of infection? No. Patient's initial      

      sepsis screen is negative. Risk Assessment: Do you want to hurt yourself or someone         

      else? Patient reports no desire to harm self or others. Onset of symptoms was March 10,     

      2021.                                                                                       

17:14 Method Of Arrival: Ambulatory                                                           [facility] 

17:14 Acuity: SABA 3                                                                           ll1 

                                                                                                  

OB/GYN:                                                                                           

                                                                                             

01:02 LMP N/A -                                                                               iw  

                                                                                                  

Historical:                                                                                       

- Allergies:                                                                                      

                                                                                             

17:18 Morphine;                                                                               ll1 

- PMHx:                                                                                           

17:18 Endometrosis; High Cholesterol; Migraines;                                              ll1 

- PSHx:                                                                                           

17:18 Hysterectomy; Appendectomy; Cholecystectomy;                                            ll1 

                                                                                                  

- Immunization history:: Client reports receiving the 2nd dose of the Covid vaccine,              

  Flu vaccine is up to date.                                                                      

- Social history:: Smoking status: Patient denies any tobacco usage or history of.                

- Family history:: not pertinent.                                                                 

                                                                                                  

                                                                                                  

Screenin:40 Abuse screen: Denies threats or abuse. Denies injuries from another. Nutritional        iw  

      screening: No deficits noted. Tuberculosis screening: No symptoms or risk factors           

      identified. Fall Risk None identified.                                                      

                                                                                                  

Assessment:                                                                                       

19:38 General: Appears in no apparent distress. Behavior is calm, cooperative. General:       iw  

      Reports fatigue for Denies fever. Pain: Complains of pain in right aspect of thyroid        

      and right sternocleidomastoid. Neuro: Level of Consciousness is awake, alert, obeys         

      commands, Oriented to person, place, time, situation. Cardiovascular: Patient's skin is     

      warm and dry. Respiratory: Respiratory effort is even, unlabored, Respiratory pattern       

      is regular, symmetrical. EENT: Throat is clear Reports pain in neck. Musculoskeletal:       

      Range of motion: intact in all extremities.                                                 

21:15 Reassessment: Patient appears in no apparent distress at this time. Patient and/or      iw  

      family updated on plan of care and expected duration. Pain level reassessed. Patient is     

      alert, oriented x 3, equal unlabored respirations, skin warm/dry/pink.                      

22:22 Reassessment: Patient appears in no apparent distress at this time. Patient and/or      iw  

      family updated on plan of care and expected duration. Pain level reassessed. Patient is     

      alert, oriented x 3, equal unlabored respirations, skin warm/dry/pink.                      

                                                                                                  

Vital Signs:                                                                                      

17:14  / 81; Pulse 77; Resp 16; Temp 98.7; Pulse Ox 100% ; Weight 73.03 kg; Height 5    ll1 

      ft. 4 in. (162.56 cm); Pain 7/10;                                                           

17:14 Body Mass Index 27.64 (73.03 kg, 162.56 cm)                                             ll1 

                                                                                                  

ED Course:                                                                                        

17:05 Patient arrived in ED.                                                                  ds1 

17:17 Triage completed.                                                                       ll1 

17:18 Arm band placed on.                                                                     ll1 

19:09 Lulu King, RN is Primary Nurse.                                                  dm5 

19:09 Primary Nurse role handed off by Lulu King, ANN                                   iw  

19:09 Perlita Edgar, RN is Primary Nurse.                                                   iw  

19:14 Abbe Vance MD is Attending Physician.                                             leeanne 

19:38 Patient has correct armband on for positive identification.                             iw  

20:30 Initial lab(s) drawn, by me, sent to lab. Inserted saline lock: 22 gauge in right       iw  

      antecubital area, using aseptic technique.                                                  

20:45 XRAY Chest (1 view) In Process Unspecified.                                             EDMS

21:33 Soft Tissue Neck W/Contr CT In Process Unspecified.                                     EDMS

22:12 Fior Lopez MD is Referral Physician.                                           leeanne 

23:13 No provider procedures requiring assistance completed. IV discontinued, intact,         iw  

      bleeding controlled, No redness/swelling at site. Pressure dressing applied.                

                                                                                                  

Administered Medications:                                                                         

21:07 Drug: Rocephin - (cefTRIAXone) 1 grams Route: IVPB; Infused Over: 30 mins; Site: right  iw  

      antecubital;                                                                                

22:22 Drug: Augmentin (Amoxicillin-Clavulanate) 875 mg Route: PO;                             iw  

22:22 Drug: Decadron - Dexamethasone 10 mg Route: IVP; Site: right antecubital;               iw  

                                                                                                  

                                                                                                  

Outcome:                                                                                          

22:12 Discharge ordered by MD.                                                                leeanne 

23:13 Discharged to home ambulatory, with family.                                             iw  

23:13 Condition: good                                                                             

23:13 Discharge instructions given to patient, Instructed on discharge instructions, follow       

      up and referral plans. medication usage, Demonstrated understanding of instructions,        

      follow-up care, medications, Prescriptions given X 3.                                       

23:14 Patient left the ED.                                                                    iw  

                                                                                                  

Signatures:                                                                                       

Dispatcher [employer]                           EDMS                                                 

Lulu King RN RN dm5 Anderson, Corey, MD MD cha Sanford, Demi                                ds1                                                  

Perlita Edgar RN RN   iw                                                   

Candida Bose RN                       RN   ll1                                                  

                                                                                                  

**************************************************************************************************

## 2021-03-17 NOTE — RAD REPORT
EXAM DESCRIPTION:  RAD - Chest Single View - 3/17/2021 8:45 pm

 

CLINICAL HISTORY:  COUGH

 

COMPARISON:  Single-view chest August 9, 2020

 

TECHNIQUE:  AP portable chest image was obtained 3/17/2021 8:45 pm .

 

FINDINGS:  Lungs are clear. Heart and vasculature are normal. No measurable pleural effusion and no p
neumothorax. No acute bony abnormality seen. No acute aortic findings suspected.

 

IMPRESSION:  No acute cardiopulmonary process.

 

No significant change from comparison study.

## 2021-03-17 NOTE — XMS REPORT
Continuity of Care Document

                            Created on:2021



Patient:MIGUEL LOPEZ

Sex:Female

:1980

External Reference #:[id number]





Demographics







                          Address                   [address]



                                                    [place] [address]

 

                          Home Phone                [phone]

 

                          Mobile Phone              [phone]

 

                          Email Address             [email or web address]

 

                          Preferred Language        English

 

                          Marital Status            Unknown

 

                          Mandaeism Affiliation     Unknown

 

                          Race                      Unknown

 

                          Additional Race(s)        Unavailable



                                                    White

 

                          Ethnic Group              Unknown









Author







                          Organization              [facility]

t

 

                          Address                   [address] [place] [address] [address]



                                                    [place] [address]

 

                          Phone                     [phone]









Support







                Name            Relationship    Address         Phone

 

                Asya        Other           [address]    [phone]



                                                [place] [address] 

 

                Raphael         Spouse          [address] [phone]



                                                [place] [address] 









Care Team Providers







                    Name                Role                Phone

 

                    You RN             Attending Clinician Unavailable

 

                    Lab,  [facility]     Attending Clinician Unavailable









Problems

This patient has no known problems.



Allergies, Adverse Reactions, Alerts

This patient has no known allergies or adverse reactions.



Medications

This patient has no known medications.



Procedures

This patient has no known procedures.



Encounters







        Start   End     Encounter Admission Attending Care    Care    Encounter 

Source



        Date/Time Date/Time Type    Type    Clinicians Facility Department ID   

   

 

        2020-07-10 2020-07-10 Telephone         Ana Orta    [id number]

[id number] 



        00:00:00 00:00:00                         [place]   [id number]         



                                                [facility] [id number]         



                                                        [phone]         



                                                        019             

 

        2020 Laboratory         Lab, [facility]    [id number]

[id number] 



        06:51:46 07:11:46 Only            [facility]  [id number]         



                                                [place] [id number]         



                                                [place] [phone]         



                                                nal     044             



                                                Office                  



                                                Building                 



                                                One                     







Results

This patient has no known results.

## 2021-03-18 NOTE — RAD REPORT
EXAM DESCRIPTION:  Soft Tissue Neck W/Contr 3/17/2021 9:59 PM CDT

 

CLINICAL HISTORY:  41 years, Female, PAIN

 

COMPARISON:  None.

 

TECHNIQUE:  Multiple transaxial tomograms of the neck were obtained from the base of the skull to the
 pulmonary apex utilizing 3 mm slice thickness at 3 mm interval reconstruction after the administrati
on of 100 cc of Omnipaque 350 at a rate of 3.0 cc/s. In addition coronal and sagittal spinal reformat
s were generated and reviewed.

An individualized dose optimization technique, Automated Exposure Control, was utilized for the perfo
rmed procedure.

 

FINDINGS:  Skull base demonstrate to be normal.   Nasopharynx, oropharynx, hypopharynx is normal. The
re is no evidence for significant abnormal enhancement of the mucosa. There is no evidence for signif
icant abnormal masses and/or peritonsillar abscess and/or prevertebral abscess. Parapharyngeal space 
demonstrate to be normal. Parotid and submandibular glands are normal. Dental amalgam limits the eval
uation.   Tiny nondiagnostic lymph nodes are seen within the posterior neck.   Paravertebral elements
 are grossly unremarkable.   There is no evidence for significant neck lymphadenopathy. The thyroid g
land demonstrate to be within normal limits. Tiny heterogeneous nodule measuring 8.3 mm left side on 
image 56/69.

 

IMPRESSION:  Unremarkable soft tissue neck with contrast. No masses, abscess formation and/or signifi
cant cervical lymphadenopathy.

 

Electronically signed by:   Salvatore Chapman MD   3/17/2021 10:03 PM CDT Workstation: [id number]

 

 

Due to temporary technical issues with the PACS/Fluency reporting system, reports are being signed by
 the in house radiologist without review as a courtesy to ensure prompt reporting. The interpreting r
adiologist is fully responsible for the content of the report.

## 2022-04-28 ENCOUNTER — HOSPITAL ENCOUNTER (EMERGENCY)
Dept: HOSPITAL 97 - ER | Age: 42
Discharge: HOME | End: 2022-04-28
Payer: SELF-PAY

## 2022-04-28 VITALS — DIASTOLIC BLOOD PRESSURE: 79 MMHG | SYSTOLIC BLOOD PRESSURE: 127 MMHG

## 2022-04-28 VITALS — TEMPERATURE: 98.6 F

## 2022-04-28 VITALS — OXYGEN SATURATION: 99 %

## 2022-04-28 DIAGNOSIS — M54.12: Primary | ICD-10-CM

## 2022-04-28 DIAGNOSIS — Z88.5: ICD-10-CM

## 2022-04-28 DIAGNOSIS — E78.00: ICD-10-CM

## 2022-04-28 LAB
ALBUMIN SERPL BCP-MCNC: 3.7 G/DL (ref 3.4–5)
ALP SERPL-CCNC: 56 U/L (ref 45–117)
ALT SERPL W P-5'-P-CCNC: 15 U/L (ref 12–78)
AST SERPL W P-5'-P-CCNC: 7 U/L (ref 15–37)
BUN BLD-MCNC: 14 MG/DL (ref 7–18)
GLUCOSE SERPLBLD-MCNC: 89 MG/DL (ref 74–106)
HCT VFR BLD CALC: 40.1 % (ref 36–45)
INR BLD: 1.07
LYMPHOCYTES # SPEC AUTO: 2.4 K/UL (ref 0.7–4.9)
MAGNESIUM SERPL-MCNC: 2.1 MG/DL (ref 1.8–2.4)
NT-PROBNP SERPL-MCNC: 179 PG/ML (ref ?–125)
PMV BLD: 9.1 FL (ref 7.6–11.3)
POTASSIUM SERPL-SCNC: 3.1 MMOL/L (ref 3.5–5.1)
RBC # BLD: 4.69 M/UL (ref 3.86–4.86)
TROPONIN I SERPL HS-MCNC: < 3 PG/ML (ref ?–58.9)

## 2022-04-28 PROCEDURE — 85025 COMPLETE CBC W/AUTO DIFF WBC: CPT

## 2022-04-28 PROCEDURE — 96375 TX/PRO/DX INJ NEW DRUG ADDON: CPT

## 2022-04-28 PROCEDURE — 72040 X-RAY EXAM NECK SPINE 2-3 VW: CPT

## 2022-04-28 PROCEDURE — 71045 X-RAY EXAM CHEST 1 VIEW: CPT

## 2022-04-28 PROCEDURE — 93005 ELECTROCARDIOGRAM TRACING: CPT

## 2022-04-28 PROCEDURE — 84484 ASSAY OF TROPONIN QUANT: CPT

## 2022-04-28 PROCEDURE — 83735 ASSAY OF MAGNESIUM: CPT

## 2022-04-28 PROCEDURE — 80048 BASIC METABOLIC PNL TOTAL CA: CPT

## 2022-04-28 PROCEDURE — 85379 FIBRIN DEGRADATION QUANT: CPT

## 2022-04-28 PROCEDURE — 96374 THER/PROPH/DIAG INJ IV PUSH: CPT

## 2022-04-28 PROCEDURE — 80076 HEPATIC FUNCTION PANEL: CPT

## 2022-04-28 PROCEDURE — 99285 EMERGENCY DEPT VISIT HI MDM: CPT

## 2022-04-28 PROCEDURE — 85610 PROTHROMBIN TIME: CPT

## 2022-04-28 PROCEDURE — 36415 COLL VENOUS BLD VENIPUNCTURE: CPT

## 2022-04-28 PROCEDURE — 83880 ASSAY OF NATRIURETIC PEPTIDE: CPT

## 2022-04-28 NOTE — RAD REPORT
EXAM DESCRIPTION:  RAD - C Spine Ap/Lat - 4/28/2022 3:33 pm

 

CLINICAL HISTORY:  neck pain

Neck pain, radiculopathy

 

COMPARISON:  No comparisons

 

FINDINGS:  Cervical bodies are normal in height and alignment.No fracture or acute bony process seen.
Disc thinning with small endplate osteophytes lower cervical spine.

 

No prevertebral soft tissue thickening or other suspicious soft tissue finding.

 

 

IMPRESSION:  Mild lower cervical spondylosis.

## 2022-04-28 NOTE — RAD REPORT
EXAM DESCRIPTION:  RAD - Chest Single View - 4/28/2022 3:33 pm

 

CLINICAL HISTORY:  CHEST PAIN

Chest pain.

 

COMPARISON:  Chest Single View dated 3/17/2021; Chest Single View dated 8/9/2020; Chest Pa And Lat (2
 Views) dated 12/20/2017

 

FINDINGS:  Portable technique limits examination quality.

 

The lungs are grossly clear. The heart is normal in size. No displaced fractures.Subtle dextroscolios
is of the thoracic spine.

 

IMPRESSION:  No acute intrathoracic process suspected.

## 2022-04-28 NOTE — EDPHYS
Physician Documentation                                                                           

 [facility]                                                                 

Name: Dom Lim                                                                              

Age: 42 yrs                                                                                       

Sex: Female                                                                                       

: 1980                                                                                   

MRN: [id number]                                                                                   

Arrival Date: 2022                                                                          

Time: 14:32                                                                                       

Account#: [id number]                                                                            

Bed 25                                                                                            

Private MD:                                                                                       

ED Physician Santy Betancourt                                                                    

HPI:                                                                                              

                                                                                             

14:52 This 42 yrs old Female presents to ER via Ambulatory with complaints of Chest Pain,     jmm 

      Headache, Numbness Of Arm.                                                                  

14:52 The patient or guardian complains of pain. Onset: The symptoms/episode began/occurred   jmm 

      gradually, 1 week(s) ago. Associated signs and symptoms: The patient denies any alcohol     

      use. This is a 42 year old female with a history of HLP, migraines that presents to the     

      ED with complaints of left sided neck pain radiating into the left arm. Patient also        

      complaints of substernal chest pain which radiates into her back. Was seen at [place] 1      

      week ago and diagnosed with a "pinched nerve". .                                            

                                                                                                  

OB/GYN:                                                                                           

14:42 LMP N/A - Hysterectomy                                                                  ld1 

                                                                                                  

Historical:                                                                                       

- Allergies:                                                                                      

14:42 Morphine;                                                                               ld1 

- Home Meds:                                                                                      

14:42 Adipex-P oral [Active];                                                                 ld1 

- PMHx:                                                                                           

14:42 Endometrosis; High Cholesterol; Migraines;                                              ld1 

- PSHx:                                                                                           

14:42 Cholecystectomy; hysterectomy; Appendectomy;                                            ld1 

                                                                                                  

- Immunization history:: Adult Immunizations up to date, Client reports receiving the             

  2nd dose of the Covid vaccine.                                                                  

- Social history:: Smoking status: Patient denies any tobacco usage or history of.                

  Patient/guardian denies using alcohol.                                                          

                                                                                                  

                                                                                                  

ROS:                                                                                              

14:52 Constitutional: Negative for fever, chills, and weight loss.                            jmm 

14:52 Neck: Positive for pain with movement.                                                      

14:52 Cardiovascular: Positive for chest pain.                                                    

14:52 All other systems are negative.                                                             

                                                                                                  

Exam:                                                                                             

14:52 Constitutional:  This is a well developed, well nourished patient who is awake, alert,  jmm 

      and in no acute distress. Head/Face:  atraumatic. Eyes:  EOMI, no conjunctival erythema     

      appreciated ENT:  Moist Mucus Membranes                                                     

14:52 Respiratory:  Normal respirations, no respiratory distress appreciated Abdomen/GI:  Non     

      distended, soft Back:  Normal ROM Skin:  General appearance color normal MS/ Extremity:     

       Moves all extremities, no obvious deformities appreciated, no edema noted to the lower     

      extremities  Neuro:  Awake and alert Psych:  Behavior is normal, Mood is normal,            

      Patient is cooperative and pleasant                                                         

14:52 Neck: ROM/movement: pain, that is mild, with any movement.                                  

14:52 Chest/axilla: Inspection: normal, Palpation: is normal, no tenderness.                      

14:52 Cardiovascular: Rate: normal, Rhythm: regular.                                              

                                                                                                  

Vital Signs:                                                                                      

14:40  / 73; Pulse 107; Resp 22; Temp 98.6(TE); Pulse Ox 100% on R/A; Weight 63.5 kg;   ld1 

      Height 5 ft. 4 in. (162.56 cm); Pain 6/10;                                                  

15:30  / 71; Pulse 101; Resp 18; Pulse Ox 99% on R/A;                                   ab2 

17:07  / 79; Pulse 97; Resp 17; Pulse Ox 99% on R/A;                                    ab2 

14:40 Body Mass Index 24.03 (63.50 kg, 162.56 cm)                                             ld1 

                                                                                                  

MDM:                                                                                              

14:52 Patient medically screened.                                                             brian 

17:16 Data reviewed: vital signs, nurses notes. Counseling: I had a detailed discussion with  brian 

      the patient and/or guardian regarding: the historical points, exam findings, and any        

      diagnostic results supporting the discharge/admit diagnosis, lab results, radiology         

      results, the need for outpatient follow up, to return to the emergency department if        

      symptoms worsen or persist or if there are any questions or concerns that arise at          

      home. ED course: Patient is alert and non toxic in appearance in the ED. Troponin           

      negative. I do not suspect acs. D-dimer negative. I do not suspect PE. Patient has full     

       strength. Patient advised to follow up with cardiology and spine. Patient              

      otherwise given strict return precautions. patient understood and agrees with the plan      

      of care. .                                                                                  

                                                                                                  

                                                                                             

14:56 Order name: Basic Metabolic Panel; Complete Time: 15:46                                 [facility] 

                                                                                             

14:56 Order name: CBC with Diff; Complete Time: 15:46                                         [facility] 

                                                                                             

14:56 Order name: D-Dimer; Complete Time: 15:46                                               [facility] 

                                                                                             

14:56 Order name: LFT's; Complete Time: 15:46                                                 [facility] 

                                                                                             

14:56 Order name: Magnesium; Complete Time: 15:46                                             [facility] 

                                                                                             

14:56 Order name: NT PRO-BNP; Complete Time: 15:46                                            [facility] 

                                                                                             

14:56 Order name: PT-INR; Complete Time: 15:46                                                [facility] 

                                                                                             

14:56 Order name: Troponin HS; Complete Time: 15:46                                           [facility] 

                                                                                             

14:56 Order name: EKG; Complete Time: 14:57                                                   [facility] 

                                                                                             

14:56 Order name: Cardiac monitoring; Complete Time: 14:57                                    [facility] 

                                                                                             

15:02 Order name: Chest Single View XRAY; Complete Time: 16:02                                [facility] 

                                                                                             

15:04 Order name: C Spine Ap/Lat XRAY; Complete Time: 16:02                                   [facility] 

                                                                                             

14:56 Order name: EKG - Nurse/Tech; Complete Time: 14:57                                      [facility] 

                                                                                             

14:56 Order name: IV Saline Lock; Complete Time: 14:57                                        [facility] 

                                                                                             

14:56 Order name: Labs collected and sent; Complete Time: 14:58                               [facility] 

                                                                                             

14:56 Order name: O2 Per Protocol; Complete Time: 14:58                                       [facility] 

                                                                                             

14:56 Order name: O2 Sat Monitoring; Complete Time: 14:58                                     [facility] 

                                                                                                  

Administered Medications:                                                                         

15:14 Drug: Valium (diazepam) 5 mg Route: IVP; Site: left antecubital;                        ab2 

17:26 Follow up: Response: No adverse reaction                                                ab2 

15:14 Drug: Ketorolac 30 mg Route: IVP; Site: left antecubital;                               ab2 

17:26 Follow up: Response: No adverse reaction                                                ab2 

                                                                                                  

                                                                                                  

Disposition:                                                                                      

18:13 Co-signature as Attending Physician, Santy Betancourt MD.                               ma2 

                                                                                                  

Disposition Summary:                                                                              

22 17:20                                                                                    

Discharge Ordered                                                                                 

      Location: Home                                                                          [facility] 

      Condition: Stable                                                                       [facility] 

      Diagnosis                                                                                   

        - Chest pain, unspecified                                                             jmm 

        - Radiculopathy, cervical region                                                      jmm 

      Followup:                                                                               [facility] 

        - With: Horacio Blackman MD                                                                 

        - When: 2 - 3 days                                                                         

        - Reason: Recheck today's complaints, Continuance of care, Re-evaluation by your           

      physician                                                                                   

      Discharge Instructions:                                                                     

        - Discharge Summary Sheet                                                             jmm 

        - Cervical Radiculopathy                                                              jmm 

        - Nonspecific Chest Pain, Adult                                                       jmm 

      Forms:                                                                                      

        - Medication Reconciliation Form                                                      [facility] 

        - Thank You Letter                                                                    jm 

        - Antibiotic Education                                                                jmm 

        - Prescription Opioid Use                                                             jmm 

        - Work release form                                                                   [facility] 

      Prescriptions:                                                                              

        - Zanaflex 4 mg Oral Tablet                                                                

            - take 1 tablet by ORAL route every 8 hours As needed; 20 tablet; Refills: 0,     [facility] 

      Product Selection Permitted                                                                 

        - Diclofenac Sodium 75 mg Oral Tablet Sustained Release                                    

            - take 1 tablet by ORAL route 2 times per day; 30 tablet; Refills: 0, Product     [facility] 

      Selection Permitted                                                                         

        - Medrol (Tiago) 4 mg Oral Tablets, Dose Pack                                                

            - take 1 tablet by ORAL route as directed - follow package instructions; 1        [facility] 

      packet; Refills: 0, Product Selection Permitted                                             

Signatures:                                                                                       

Dispatcher [employer]                           EDGino Ndiaye PA PA   [facility]                                                  

Santy Betancourt MD MD   ma2                                                  

Rachael Fox RN                     RN   ld1                                                  

Greg Brown2                                                  

                                                                                                  

**************************************************************************************************

## 2022-04-28 NOTE — ER
Nurse's Notes                                                                                     

 [facility]                                                                 

Name: Dom Lim                                                                              

Age: 42 yrs                                                                                       

Sex: Female                                                                                       

: 1980                                                                                   

MRN: [id number]                                                                                   

Arrival Date: 2022                                                                          

Time: 14:32                                                                                       

Account#: [id number]                                                                            

Bed 25                                                                                            

Private MD:                                                                                       

Diagnosis: Chest pain, unspecified;Radiculopathy, cervical region                                 

                                                                                                  

Presentation:                                                                                     

                                                                                             

14:40 Chief complaint: Patient states: Headache, left sided neck pain X 1 day. Today pt began ld1 

      to feel chest pain \T\ left arm numbness at 12:00. Pt reports going to [facility]      

      last week for the same complaints - was told it was a "nerve" problem. Coronavirus          

      screen: At this time, the client does not indicate any symptoms associated with             

      coronavirus-19. Ebola Screen: No symptoms or risks identified at this time. Initial         

      Sepsis Screen: Does the patient meet any 2 criteria? No. Patient's initial sepsis           

      screen is negative. Does the patient have a suspected source of infection? No.              

      Patient's initial sepsis screen is negative. Risk Assessment: Do you want to hurt           

      yourself or someone else? Patient reports no desire to harm self or others. Onset of        

      symptoms was 2022.                                                                

14:40 Method Of Arrival: Ambulatory                                                           ld1 

14:40 Acuity: SABA 3                                                                           ld1 

                                                                                                  

Triage Assessment:                                                                                

14:42 General: Appears in no apparent distress. uncomfortable, Behavior is cooperative,       ld1 

      anxious. Pain: Complains of pain in chest and left sternocleidomastoid Pain does not        

      radiate. Pain currently is 6 out of 10 on a pain scale. Quality of pain is described as     

      throbbing. EENT: No signs and/or symptoms were reported regarding the EENT system.          

      Neuro: Level of Consciousness is awake, alert, obeys commands, Oriented to person,          

      place, time, situation. Cardiovascular: Capillary refill < 3 seconds Patient's skin is      

      warm and dry. Rhythm is sinus tachycardia. Respiratory: Airway is patent Respiratory        

      effort is even, unlabored. Derm: Reports tingling, since in left arm since 1200 today..     

      Musculoskeletal: Reports numbness in left arm.                                              

                                                                                                  

OB/GYN:                                                                                           

14:42 LMP N/A - Hysterectomy                                                                  ld1 

                                                                                                  

Historical:                                                                                       

- Allergies:                                                                                      

14:42 Morphine;                                                                               ld1 

- Home Meds:                                                                                      

14:42 Adipex-P oral [Active];                                                                 ld1 

- PMHx:                                                                                           

14:42 Endometrosis; High Cholesterol; Migraines;                                              ld1 

- PSHx:                                                                                           

14:42 Cholecystectomy; hysterectomy; Appendectomy;                                            ld1 

                                                                                                  

- Immunization history:: Adult Immunizations up to date, Client reports receiving the             

  2nd dose of the Covid vaccine.                                                                  

- Social history:: Smoking status: Patient denies any tobacco usage or history of.                

  Patient/guardian denies using alcohol.                                                          

                                                                                                  

                                                                                                  

Screening:                                                                                        

15:08 Abuse screen: Denies threats or abuse. Denies injuries from another. Nutritional        ab2 

      screening: No deficits noted. Tuberculosis screening: No symptoms or risk factors           

      identified. Fall Risk None identified.                                                      

                                                                                                  

Assessment:                                                                                       

15:08 General: Appears in no apparent distress. comfortable, Behavior is calm, cooperative,   ab2 

      appropriate for age. Pain: Complains of pain in left arm and chest Pain began 2-3 days      

      ago. Neuro: No deficits noted. Level of Consciousness is awake, alert, obeys commands,      

      Oriented to person, place, time, situation, Appropriate for age  are equal             

      bilaterally Moves all extremities. Gait is steady, Speech is normal, Facial symmetry        

      appears normal. Cardiovascular: Denies chest pain, Heart tones S1 S2 present Patient's      

      skin is warm and dry. Rhythm is sinus rhythm. Respiratory: Airway is patent Respiratory     

      effort is even, unlabored, Respiratory pattern is regular, symmetrical, Breath sounds       

      are clear bilaterally. GI: No deficits noted. No signs and/or symptoms were reported        

      involving the gastrointestinal system. Abdomen is round non-distended, Bowel sounds         

      present X 4 quads. : No deficits noted. No signs and/or symptoms were reported            

      regarding the genitourinary system.                                                         

                                                                                                  

Vital Signs:                                                                                      

14:40  / 73; Pulse 107; Resp 22; Temp 98.6(TE); Pulse Ox 100% on R/A; Weight 63.5 kg;   ld1 

      Height 5 ft. 4 in. (162.56 cm); Pain 6/10;                                                  

15:30  / 71; Pulse 101; Resp 18; Pulse Ox 99% on R/A;                                   ab2 

17:07  / 79; Pulse 97; Resp 17; Pulse Ox 99% on R/A;                                    ab2 

14:40 Body Mass Index 24.03 (63.50 kg, 162.56 cm)                                             ld1 

                                                                                                  

ED Course:                                                                                        

14:32 Patient arrived in ED.                                                                  mr  

14:39 Gino Light PA is PHCP.                                                              [facility] 

14:39 Santy Betancourt MD is Attending Physician.                                           [facility] 

14:40 Rachael Fox, RN is Primary Nurse.                                                   ld1 

14:42 Triage completed.                                                                       ld1 

14:42 Arm band placed on right wrist.                                                         ld1 

14:57 Greg Brown is Primary Nurse.                                                    ab2 

15:08 No provider procedures requiring assistance completed. Inserted saline lock: 20 gauge   ab2 

      in right antecubital area, using aseptic technique. Patient maintains SpO2 saturation       

      greater than 95% on room air.                                                               

15:09 Patient has correct armband on for positive identification. Bed in low position. Call   ab2 

      light in reach. Side rails up X2. Cardiac monitor on. Pulse ox on. NIBP on.                 

15:35 Chest Single View XRAY In Process Unspecified.                                          EDMS

15:35 C Spine Ap/Lat XRAY In Process Unspecified.                                             EDMS

17:19 Horacio Blackman MD is Referral Physician.                                               [facility] 

17:27 IV discontinued, intact, bleeding controlled, No redness/swelling at site. Pressure     ab2 

      dressing applied.                                                                           

                                                                                                  

Administered Medications:                                                                         

15:14 Drug: Valium (diazepam) 5 mg Route: IVP; Site: left antecubital;                        ab2 

17:26 Follow up: Response: No adverse reaction                                                ab2 

15:14 Drug: Ketorolac 30 mg Route: IVP; Site: left antecubital;                               ab2 

17:26 Follow up: Response: No adverse reaction                                                ab2 

                                                                                                  

                                                                                                  

Outcome:                                                                                          

17:20 Discharge ordered by MD.                                                                jmm 

17:27 Discharged to home ambulatory, with family.                                             ab2 

17:27 Condition: good                                                                             

17:27 Discharge instructions given to patient, family, Instructed on discharge instructions,      

      follow up and referral plans. medication usage, Demonstrated understanding of               

      instructions, follow-up care, medications, Prescriptions given X 3.                         

17:27 Patient left the ED.                                                                    ab2 

                                                                                                  

Signatures:                                                                                       

Dispatcher [employer]                           EDMS                                                 

Gino Light PA PA   [facility]                                                  

Leanne Barr                                 mr                                                   

Rachael Fox, RN                     RN   ld1                                                  

Greg Brown                           ab2                                                  

                                                                                                  

**************************************************************************************************

## 2022-04-28 NOTE — XMS REPORT
Continuity of Care Document

                            Created on:2022



Patient:MIGUEL LOPEZ

Sex:Female

:1980

External Reference #:[id number]





Demographics







                          Address                   [address]



                                                    [place] [address]

 

                          Home Phone                [phone]

 

                          Mobile Phone              [phone]

 

                          Email Address             [email or web address]

 

                          Preferred Language        English

 

                          Marital Status            Unknown

 

                          Caodaism Affiliation     Unknown

 

                          Race                      Unknown

 

                          Additional Race(s)        Unavailable



                                                    White

 

                          Ethnic Group              Unknown









Author







                          Organization              [facility]

t

 

                          Address                   [address] [place] [address] [address]



                                                    [place] [address]

 

                          Phone                     [phone]









Support







                Name            Relationship    Address         Phone

 

                Asya        Other           [address]    [phone]



                                                [place] [address] 

 

                Raphael         Spouse          [address] [phone]



                                                [place] [address] 









Care Team Providers







                    Name                Role                Phone

 

                    You RN             Attending Clinician Unavailable

 

                    Lab,  [facility]am [facility]     Attending Clinician Unavailable

 

                    ANENE               Attending Clinician Unavailable









Problems

This patient has no known problems.



Allergies, Adverse Reactions, Alerts







       Allergy Allergy Status Severity Reaction(s) Onset  Inactive Treating Comm

ents 

Source



       Name   Type                        Date   Date   Clinician        

 

       MORPHINE DRUG   Active        N/V                          [facility]                                              ity of



                                          00::                      [address]







Medications

This patient has no known medications.



Procedures

This patient has no known procedures.



Encounters







        Start   End     Encounter Admission Attending Care    Care    Encounter 

Source



        Date/Time Date/Time Type    Type    Clinicians Facility Department ID   

   

 

        2020-07-10 2020-07-10 Telephone         Ana Orta    [id number]

[id number] 



        00:00:00 00:00:00                         WERNER   [id number]         



                                                [facility] [id number]         



                                                        [phone]         



                                                        019             

 

        2020 Laboratory         Lab, [facility]    [id number]

[id number] 



        06:51:46 07:11:46 Only            [facility]  [id number]         



                                                [place] [id number]         



                                                [place] [phone]         



                                                nal     [address]             



                                                Office                  



                                                Building                 



                                                One                     

 

        2020 Outpatient R               [facility]    [id number]

-20 [facility]



        07:00:00 07:00:00                                           ity [facility]

 

        2020 Outpatient R       STEFANIA  [facility]    [id number]

[address] [facility]



        07:00:00 07:00:00                 KIMO becerra [facility]







Results

This patient has no known results.

## 2022-04-29 NOTE — EKG
Test Date:    2022-04-28               Test Time:    14:38:26

Technician:   AB                                     

                                                     

MEASUREMENT RESULTS:                                       

Intervals:                                           

Rate:         94                                     

CT:           136                                    

QRSD:         86                                     

QT:           350                                    

QTc:          437                                    

Axis:                                                

P:            94                                     

CT:           136                                    

QRS:          -14                                    

T:            7                                      

                                                     

INTERPRETIVE STATEMENTS:                                       

                                                     

Normal sinus rhythm

Voltage criteria for left ventricular hypertrophy

Abnormal ECG

Compared to ECG 03/17/2021 20:56:48

Left ventricular hypertrophy now present



Electronically Signed On 04-29-22 07:42:26 CDT by Brad Marx

## 2024-08-13 NOTE — EDPHYS
[x][facility]      [][facility]     Outpatient Pediatric Rehab Dept      Outpatient Pediatric Rehab Dept     [address] [facility].        [address], [address]     [place], [place] [address]       [place], [place] [address] [phone]     Fax [phone]        Fax: [phone]    [][facility]      Outpatient Rehab Center          [address], [place] [address] [phone] Fax [phone]     PEDIATRIC THERAPY DAILY FLOWSHEET  [x] Occupational Therapy []Physical Therapy [] Speech and Language Pathology    Name: Amy Alegria   : 2020  MR#: [id number]   Referring Physician: LOBO Carrizales CNP  Date of Evaluation: 2023                            Referring Diagnosis and ICD 10 Code: Global developmental delay F88    Date of Onset: birth   Treatment Diagnoses and ICD 10 tx Code: Global developmental delay F88 ; Fine motor delay F82 ; Sensory processing F88 ; Feeding difficulties R63.3     POC Due Date: 2024 Attendance:              Attended: 24  Cancels: 5  No Shows: 3  Attendance Since Last POC on 2024: Attended: 6  Cancels: 1  No Shows: 0      Objective Findings:  Date 2024     Time in/out 130/200 130/200     Total Tx Min. 30 30     Timed Tx Min. 30 30     Charges 2 2     Pain (0-10) 0 0     Subjective/  Adverse Reaction to tx Pt pleasant and cooperative throughout session. Mom reporting wanting after 3:00 after school time for upcoming school year, plan to view schedule and discuss next week.  Pt pleasant and cooperative throughout session. Discussed after-school time options with Mom. Mom reporting to call clinic by end of week with decision. No new reports from Mom.      GOALS       1. Amy will complete visual motor activities (stacking blocks, shape sorters, lacing beads, puzzles, etc) with minimal assistance in 3/4  Physician Documentation                                                                           

 [facility]                                                                 

Name: Dom Lim                                                                              

Age: 41 yrs                                                                                       

Sex: Female                                                                                       

: 1980                                                                                   

MRN: [id number]                                                                                   

Arrival Date: 2021                                                                          

Time: 17:05                                                                                       

Account#: [id number]                                                                            

Bed 15                                                                                            

Private MD:                                                                                       

STEPHY Physician Abbe Vance                                                                      

HPI:                                                                                              

                                                                                             

20:03 This 41 yrs old  Female presents to ER via Ambulatory with complaints of Neck  leeanne 

      Issue, Ear Pain.                                                                            

20:03 The patient presents with pain, that is acute. The complaints affect the right ear,     leeanne 

      left ear and chin. Onset: The symptoms/episode began/occurred 3 day(s) ago. Modifying       

      factors: The symptoms are alleviated by nothing, the symptoms are aggravated by             

      nothing. Associated signs and symptoms: The patient has no apparent associated signs or     

      symptoms. Severity of symptoms: At their worst the symptoms were mild in the emergency      

      department the symptoms are unchanged. The patient has not experienced similar symptoms     

      in the past.                                                                                

                                                                                                  

OB/GYN:                                                                                           

                                                                                             

01:02 LMP N/A -                                                                               iw  

                                                                                                  

Historical:                                                                                       

- Allergies:                                                                                      

                                                                                             

17:18 Morphine;                                                                               ll1 

- PMHx:                                                                                           

17:18 Endometrosis; High Cholesterol; Migraines;                                              ll1 

- PSHx:                                                                                           

17:18 Hysterectomy; Appendectomy; Cholecystectomy;                                            ll1 

                                                                                                  

- Immunization history:: Client reports receiving the 2nd dose of the Covid vaccine,              

  Flu vaccine is up to date.                                                                      

- Social history:: Smoking status: Patient denies any tobacco usage or history of.                

- Family history:: not pertinent.                                                                 

                                                                                                  

                                                                                                  

ROS:                                                                                              

20:03 Constitutional: Negative for fever, chills, and weight loss, Eyes: Negative for injury, leeanne 

      pain, redness, and discharge, Neck: Negative for injury, pain, and swelling,                

      Cardiovascular: Negative for chest pain, palpitations, and edema, Respiratory: Negative     

      for shortness of breath, cough, wheezing, and pleuritic chest pain, Abdomen/GI:             

      Negative for abdominal pain, nausea, vomiting, diarrhea, and constipation, Back:            

      Negative for injury and pain, : Negative for injury, bleeding, discharge, and             

      swelling, MS/Extremity: Negative for injury and deformity, Skin: Negative for injury,       

      rash, and discoloration, Neuro: Negative for headache, weakness, numbness, tingling,        

      and seizure, Psych: Negative for depression, anxiety, suicide ideation, homicidal           

      ideation, and hallucinations, Allergy/Immunology: Negative for hives, rash, and             

      allergies, Endocrine: Negative for neck swelling, polydipsia, polyuria, polyphagia, and     

      marked weight changes, Hematologic/Lymphatic: Negative for swollen nodes, abnormal          

      bleeding, and unusual bruising.                                                             

20:03 ENT: Positive for ear pain, hearing loss.                                                   

                                                                                                  

Exam:                                                                                             

20:03 Constitutional:  This is a well developed, well nourished patient who is awake, alert,  leeanne 

      and in no acute distress. Head/Face:  Normocephalic, atraumatic. Eyes:  Pupils equal        

      round and reactive to light, extra-ocular motions intact.  Lids and lashes normal.          

      Conjunctiva and sclera are non-icteric and not injected.  Cornea within normal limits.      

      Periorbital areas with no swelling, redness, or edema. Neck:  Trachea midline, no           

      thyromegaly or masses palpated, and no cervical lymphadenopathy.  Supple, full range of     

      motion without nuchal rigidity, or vertebral point tenderness.  No Meningismus.             

      Chest/axilla:  Normal chest wall appearance and motion.  Nontender with no deformity.       

      No lesions are appreciated. Cardiovascular:  Regular rate and rhythm with a normal S1       

      and S2.  No gallops, murmurs, or rubs.  Normal PMI, no JVD.  No pulse deficits.             

      Respiratory:  Lungs have equal breath sounds bilaterally, clear to auscultation and         

      percussion.  No rales, rhonchi or wheezes noted.  No increased work of breathing, no        

      retractions or nasal flaring. Abdomen/GI:  Soft, non-tender, with normal bowel sounds.      

      No distension or tympany.  No guarding or rebound.  No evidence of tenderness               

      throughout. Back:  No spinal tenderness.  No costovertebral tenderness.  Full range of      

      motion. Female :  Normal external genitalia. Skin:  Warm, dry with normal turgor.         

      Normal color with no rashes, no lesions, and no evidence of cellulitis. MS/ Extremity:      

      Pulses equal, no cyanosis.  Neurovascular intact.  Full, normal range of motion. Neuro:     

       Awake and alert, GCS 15, oriented to person, place, time, and situation.  Cranial          

      nerves II-XII grossly intact.  Motor strength 5/5 in all extremities.  Sensory grossly      

      intact.  Cerebellar exam normal.  Normal gait.                                              

20:03 ENT: Ear canal(s): are normal, no acute changes, TM's: dullness, erythema, that is          

      mild, on the left.                                                                          

21:45 ECG was reviewed by the Attending Physician.                                            [facility] 

                                                                                                  

Vital Signs:                                                                                      

17:14  / 81; Pulse 77; Resp 16; Temp 98.7; Pulse Ox 100% ; Weight 73.03 kg; Height 5    ll1 

      ft. 4 in. (162.56 cm); Pain 7/10;                                                           

17:14 Body Mass Index 27.64 (73.03 kg, 162.56 cm)                                             ll1 

                                                                                                  

MDM:                                                                                              

19:14 Patient medically screened.                                                             [facility] 

20:12 Differential diagnosis: otitis media. Data reviewed: vital signs, nurses notes, lab     [facility] 

      test result(s), EKG, radiologic studies, plain films. Data interpreted: Cardiac             

      monitor: rate is 77 beats/min, rhythm is regular. Test interpretation: by ED physician      

      or midlevel provider: ECG, plain radiologic studies. Counseling: I had a detailed           

      discussion with the patient and/or guardian regarding: the historical points, exam          

      findings, and any diagnostic results supporting the discharge/admit diagnosis, lab          

      results, radiology results, the need for outpatient follow up, for definitive care, an      

      ENT specialist.                                                                             

                                                                                                  

                                                                                             

20:03 Order name: Basic Metabolic Panel                                                       [facility] 

                                                                                             

20:03 Order name: CBC with Diff                                                               [facility] 

                                                                                             

20:03 Order name: LFT's; Complete Time: 21:39                                                 [facility] 

                                                                                             

20:03 Order name: Magnesium; Complete Time: 21:39                                             [facility] 

                                                                                             

20:03 Order name: Troponin (emerg Dept Use Only); Complete Time: 21:39                        [facility] 

                                                                                             

20:03 Order name: TSH; Complete Time: 21:39                                                   [facility] 

                                                                                             

20:03 Order name: XRAY Chest (1 view); Complete Time: 21:39                                   [facility] 

                                                                                             

20:03 Order name: EKG; Complete Time: 20:05                                                   [facility] 

                                                                                             

20:04 Order name: Basic Metabolic Panel; Complete Time: 21:39                                 [facility]

                                                                                             

20:24 Order name: Soft Tissue Neck W/Contr CT                                                 [facility] 

                                                                                             

21:59 Order name: CBC Smear Scan                                                              [facility]

                                                                                             

20:03 Order name: Cardiac monitoring; Complete Time: 21:07                                    [facility] 

                                                                                             

20:03 Order name: EKG - Nurse/Tech; Complete Time: 21:00                                      [facility] 

                                                                                             

20:03 Order name: IV Saline Lock; Complete Time: 21:07                                        [facility] 

                                                                                             

20:03 Order name: Labs collected and sent; Complete Time: 21:07                               [facility] 

                                                                                             

20:03 Order name: O2 Per Protocol; Complete Time: :                                       [facility] 

                                                                                             

20:03 Order name: O2 Sat Monitoring; Complete Time: 20:                                     [facility] 

                                                                                                  

EC:45 Rate is 63 beats/min. Rhythm is regular. QRS Axis is Normal. AK interval is normal. QRS leeanne 

      interval is normal. QT interval is normal. No Q waves. T waves are Normal. No ST            

      changes noted. Clinical impression: NSR w/ Non-specific ST/T Changes and No evidence of     

      ischemia. Interpreted by me. Reviewed by me.                                                

                                                                                                  

Administered Medications:                                                                         

21:07 Drug: Rocephin - (cefTRIAXone) 1 grams Route: IVPB; Infused Over: 30 mins; Site: right  iw  

      antecubital;                                                                                

22:22 Drug: Augmentin (Amoxicillin-Clavulanate) 875 mg Route: PO;                             iw  

22:22 Drug: Decadron - Dexamethasone 10 mg Route: IVP; Site: right antecubital;               iw  

                                                                                                  

                                                                                                  

Disposition:                                                                                      

21 22:12 Discharged to Home. Impression: Otitis media, unspecified, left ear, Strain of     

  muscle, fascia and tendon at neck level, Thyrotoxicosis [hyperthyroidism].                      

- Condition is Stable.                                                                            

- Discharge Instructions: Otitis Media, Adult, Otitis Media, Adult, Easy-to-Read, Ear             

  Drops, Adult, Easy-to-Read.                                                                     

- Prescriptions for Augmentin 875- 125 mg Oral Tablet - take 1 tablet by ORAL route               

  every 12 hours for 10 days; 20 tablet. Tylenol- Codeine #3 300-30 mg Oral Tablet -              

  take 2 tablets by ORAL route every 4-6 hours As needed; 20 tablet. Allegra- D 12 Hour           

   mg Oral Tablet Sustained Release 12 hr - take 1 tablet by ORAL route every 12            

  hours As needed; 20 tablet. Ciprodex 0.3- 0.1 % Otic Drops, Suspension - instill 4              

  drop by OTIC route every 12 hours for 7 days , for ears ONLY; 1 Container.                      

- Work release form, Medication Reconciliation Form, Thank You Letter, Antibiotic                 

  Education, Prescription Opioid Use form.                                                        

- Follow up: Private Physician; When: 2 - 3 days; Reason: Recheck today's complaints,             

  Continuance of care, Re-evaluation by your physician. Follow up: Firo Lopez;             

  When: 2 - 3 days; Reason: Recheck today's complaints, Re-evaluation by your physician.          

- Problem is new.                                                                                 

- Symptoms have improved.                                                                         

                                                                                                  

                                                                                                  

                                                                                                  

Signatures:                                                                                       

Dispatcher [employer]                           EDAbbe Emmanuel MD MD cha Williams, Irene, RN                     RN   iw                                                   

Candida Bose RN                       RN   ll1                                                  

                                                                                                  

Corrections: (The following items were deleted from the chart)                                    

23:14 22:12 2021 22:12 Discharged to Home. Impression: Otitis media, unspecified, left  iw  

      ear; Strain of muscle, fascia and tendon at neck level; Thyrotoxicosis                      

      [hyperthyroidism]. Condition is Stable. Discharge Instructions: Otitis Media, Adult,        

      Otitis Media, Adult, Easy-to-Read, Ear Drops, Adult, Easy-to-Read. Prescriptions for        

      Augmentin 875-125 mg Oral Tablet - take 1 tablet by ORAL route every 12 hours for 10        

      days; 20 tablet, Tylenol-Codeine #3 300-30 mg Oral Tablet - take 2 tablets by ORAL          

      route every 4-6 hours As needed; 20 tablet, Allegra-D 12 Hour  mg Oral Tablet         

      Sustained Release 12 hr - take 1 tablet by ORAL route every 12 hours As needed; 20          

      tablet, Ciprodex 0.3-0.1 % Otic Drops, Suspension - instill 4 drop by OTIC route every      

      12 hours for 7 days , for ears ONLY; 1 Container. and Forms are Medication                  

      Reconciliation Form, Thank You Letter, Antibiotic Education, Prescription Opioid Use.       

      Follow up: Private Physician; When: 2 - 3 days; Reason: Recheck today's complaints,         

      Continuance of care, Re-evaluation by your physician. Follow up: Fior Lopez;         

      When: 2 - 3 days; Reason: Recheck today's complaints, Re-evaluation by your physician.      

      Problem is new. Symptoms have improved. leeanne                                                 

                                                                                                  

**************************************************************************************************